# Patient Record
Sex: FEMALE | Race: WHITE | URBAN - METROPOLITAN AREA
[De-identification: names, ages, dates, MRNs, and addresses within clinical notes are randomized per-mention and may not be internally consistent; named-entity substitution may affect disease eponyms.]

---

## 2017-04-07 ENCOUNTER — HOSPITAL ENCOUNTER (OUTPATIENT)
Dept: MAMMOGRAPHY | Age: 72
Discharge: HOME | End: 2017-04-07
Attending: INTERNAL MEDICINE | Admitting: INTERNAL MEDICINE

## 2017-04-07 DIAGNOSIS — Z12.31 ENCOUNTER FOR SCREENING MAMMOGRAM FOR BREAST CANCER: ICD-10-CM

## 2018-06-29 ENCOUNTER — HOSPITAL ENCOUNTER (OUTPATIENT)
Dept: MAMMOGRAPHY | Age: 73
Discharge: HOME | End: 2018-06-29
Attending: OBSTETRICS & GYNECOLOGY | Admitting: OBSTETRICS & GYNECOLOGY

## 2018-06-29 DIAGNOSIS — Z12.39 BREAST CANCER SCREENING: ICD-10-CM

## 2021-03-02 ENCOUNTER — IMMUNIZATION (OUTPATIENT)
Dept: PRIMARY CARE CLINIC | Age: 76
End: 2021-03-02
Payer: MEDICARE

## 2021-03-02 PROCEDURE — 0011A PR IMM ADMN SARSCOV2 100 MCG/0.5 ML 1ST DOSE: CPT | Performed by: FAMILY MEDICINE

## 2021-03-02 PROCEDURE — 91301 COVID-19, MODERNA VACCINE 100MCG/0.5ML DOSE: CPT | Performed by: FAMILY MEDICINE

## 2021-03-30 ENCOUNTER — IMMUNIZATION (OUTPATIENT)
Dept: PRIMARY CARE CLINIC | Age: 76
End: 2021-03-30
Payer: MEDICARE

## 2021-03-30 PROCEDURE — 91301 COVID-19, MODERNA VACCINE 100MCG/0.5ML DOSE: CPT

## 2021-03-30 PROCEDURE — 0012A COVID-19, MODERNA VACCINE 100MCG/0.5ML DOSE: CPT

## 2022-11-28 ENCOUNTER — HOSPITAL ENCOUNTER (OUTPATIENT)
Dept: WOMENS IMAGING | Age: 77
Discharge: HOME | End: 2022-11-28
Payer: MEDICARE

## 2022-11-28 VITALS — BODY MASS INDEX: 30.82 KG/M2 | WEIGHT: 137 LBS | HEIGHT: 56 IN

## 2022-11-28 DIAGNOSIS — Z12.31 VISIT FOR SCREENING MAMMOGRAM: ICD-10-CM

## 2022-11-28 PROCEDURE — 77067 SCR MAMMO BI INCL CAD: CPT

## 2023-03-13 ENCOUNTER — HOSPITAL ENCOUNTER (OUTPATIENT)
Age: 78
Setting detail: OUTPATIENT SURGERY
Discharge: HOME | End: 2023-03-13
Attending: INTERNAL MEDICINE | Admitting: INTERNAL MEDICINE
Payer: MEDICARE

## 2023-03-13 ENCOUNTER — ANESTHESIA (OUTPATIENT)
Dept: ENDOSCOPY | Age: 78
End: 2023-03-13
Payer: MEDICARE

## 2023-03-13 ENCOUNTER — ANESTHESIA EVENT (OUTPATIENT)
Dept: ENDOSCOPY | Age: 78
End: 2023-03-13
Payer: MEDICARE

## 2023-03-13 VITALS
DIASTOLIC BLOOD PRESSURE: 64 MMHG | SYSTOLIC BLOOD PRESSURE: 170 MMHG | OXYGEN SATURATION: 94 % | HEART RATE: 67 BPM | WEIGHT: 140 LBS | RESPIRATION RATE: 18 BRPM | HEIGHT: 58 IN | TEMPERATURE: 97.5 F | BODY MASS INDEX: 29.39 KG/M2

## 2023-03-13 DIAGNOSIS — K75.81 LIVER CIRRHOSIS SECONDARY TO NASH (NONALCOHOLIC STEATOHEPATITIS) (HCC): ICD-10-CM

## 2023-03-13 DIAGNOSIS — K74.60 LIVER CIRRHOSIS SECONDARY TO NASH (NONALCOHOLIC STEATOHEPATITIS) (HCC): ICD-10-CM

## 2023-03-13 LAB
GLUCOSE BLD-MCNC: 113 MG/DL (ref 70–99)
PERFORMED ON: ABNORMAL

## 2023-03-13 PROCEDURE — 3700000001 HC ADD 15 MINUTES (ANESTHESIA): Performed by: INTERNAL MEDICINE

## 2023-03-13 PROCEDURE — 3609012400 HC EGD TRANSORAL BIOPSY SINGLE/MULTIPLE: Performed by: INTERNAL MEDICINE

## 2023-03-13 PROCEDURE — 2720000010 HC SURG SUPPLY STERILE: Performed by: INTERNAL MEDICINE

## 2023-03-13 PROCEDURE — 7100000010 HC PHASE II RECOVERY - FIRST 15 MIN: Performed by: INTERNAL MEDICINE

## 2023-03-13 PROCEDURE — 88305 TISSUE EXAM BY PATHOLOGIST: CPT

## 2023-03-13 PROCEDURE — 3700000000 HC ANESTHESIA ATTENDED CARE: Performed by: INTERNAL MEDICINE

## 2023-03-13 PROCEDURE — 2500000003 HC RX 250 WO HCPCS: Performed by: NURSE ANESTHETIST, CERTIFIED REGISTERED

## 2023-03-13 PROCEDURE — 2580000003 HC RX 258: Performed by: FAMILY MEDICINE

## 2023-03-13 PROCEDURE — 6360000002 HC RX W HCPCS: Performed by: NURSE ANESTHETIST, CERTIFIED REGISTERED

## 2023-03-13 PROCEDURE — 2709999900 HC NON-CHARGEABLE SUPPLY: Performed by: INTERNAL MEDICINE

## 2023-03-13 PROCEDURE — 7100000011 HC PHASE II RECOVERY - ADDTL 15 MIN: Performed by: INTERNAL MEDICINE

## 2023-03-13 RX ORDER — PROPOFOL 10 MG/ML
INJECTION, EMULSION INTRAVENOUS PRN
Status: DISCONTINUED | OUTPATIENT
Start: 2023-03-13 | End: 2023-03-13 | Stop reason: SDUPTHER

## 2023-03-13 RX ORDER — CITALOPRAM 40 MG/1
TABLET ORAL DAILY
COMMUNITY
Start: 2022-12-06

## 2023-03-13 RX ORDER — LIDOCAINE HYDROCHLORIDE 20 MG/ML
INJECTION, SOLUTION EPIDURAL; INFILTRATION; INTRACAUDAL; PERINEURAL PRN
Status: DISCONTINUED | OUTPATIENT
Start: 2023-03-13 | End: 2023-03-13 | Stop reason: SDUPTHER

## 2023-03-13 RX ORDER — SODIUM CHLORIDE, SODIUM LACTATE, POTASSIUM CHLORIDE, CALCIUM CHLORIDE 600; 310; 30; 20 MG/100ML; MG/100ML; MG/100ML; MG/100ML
INJECTION, SOLUTION INTRAVENOUS CONTINUOUS
Status: DISCONTINUED | OUTPATIENT
Start: 2023-03-13 | End: 2023-03-13 | Stop reason: HOSPADM

## 2023-03-13 RX ADMIN — PROPOFOL 50 MG: 10 INJECTION, EMULSION INTRAVENOUS at 09:41

## 2023-03-13 RX ADMIN — SODIUM CHLORIDE, POTASSIUM CHLORIDE, SODIUM LACTATE AND CALCIUM CHLORIDE: 600; 310; 30; 20 INJECTION, SOLUTION INTRAVENOUS at 09:05

## 2023-03-13 RX ADMIN — PROPOFOL 50 MG: 10 INJECTION, EMULSION INTRAVENOUS at 09:36

## 2023-03-13 RX ADMIN — PROPOFOL 60 MG: 10 INJECTION, EMULSION INTRAVENOUS at 09:30

## 2023-03-13 RX ADMIN — LIDOCAINE HYDROCHLORIDE 50 MG: 20 INJECTION, SOLUTION EPIDURAL; INFILTRATION; INTRACAUDAL; PERINEURAL at 09:30

## 2023-03-13 RX ADMIN — PROPOFOL 30 MG: 10 INJECTION, EMULSION INTRAVENOUS at 09:32

## 2023-03-13 ASSESSMENT — ENCOUNTER SYMPTOMS: SHORTNESS OF BREATH: 0

## 2023-03-13 ASSESSMENT — LIFESTYLE VARIABLES: SMOKING_STATUS: 0

## 2023-03-13 ASSESSMENT — PAIN - FUNCTIONAL ASSESSMENT: PAIN_FUNCTIONAL_ASSESSMENT: 0-10

## 2023-03-13 NOTE — PROCEDURES
EGD PROCEDURE NOTE                                                    Patient: Minna Bunch MRN: [id number]     YOB: 1945  Age: 78 y.o. Sex: female    Unit: [facility] ENDOSCOPY Room/Bed: Endo Pool/NONE Location: [address] [place] [address]       Admitting Physician: Raine Goetz    Primary Care Physician: Yaneth Matthew      Referring  Physician:      Facility:   [address] [place] [address] [Outpatient]  : Harvinder Babb MD      PROCEDURE:  Esophagogastroduodenoscopy with biopsy  Procedure(s):  EGD BIOPSY    Preoperative Diagnosis:   Liver cirrhosis secondary to RAI (nonalcoholic steatohepatitis) ([facility] [K75.81, K74.60]  Portal Hypertension  Screening  for esophageal varices    Post Operative Diagnosis:  Same as documented in Diagnosis    INSTRUMENT:  Olympus  Gastroscope    HISTORY & PHYSICAL:  Patient's history, physical, medications, allergies, labs reviewed prior to procedure. Indication:   Same as the preop diagnosis. AllergiesAtenolol, Bee venom, Ciprofloxacin, Codeine, Diclofenac, Erythromycin base, Lisinopril, Meloxicam, Nabumetone, Protein c concentrate (human), Quinolones, Sulfa antibiotics, and Adhesive tape   Allergies noted: YES  Vital signs reviewed: YES    ASA: No  found with the name: Barrett Kinsey: Monitor Anesthesia Care with anesthesia  see nurse documentation for details    Patient in acceptable condition for procedure:YES  Written informed consent obtained from  patient. . Risks (including but not limited to perforation, bloating, infection, perforation  and bleeding adverse drug reaction missed lesions and aspiration during the procedure requiring medical or surgical management) benefits and alternatives explained and questions answered. The  patient. verbalized understanding. A timeoout procedure was performed.  Based on the pre-procedure assessment, including review of the patient's medical history, medications, allergies, and review of systems, patient had been deemed to be an appropriate candidate for sedation as planned above. Patient was therefore sedated with the medications listed above. Patient was monitored continuously with electrocardiogram tracing, pulse oximetry, blood pressure monitoring, and direct visualization. Under continue IV sedation with close monitoring, endoscope passed in to the 3rd portion of the duodenum by direct visualization. POST OPERATIVE FINDINGS:   ESOPHAGUS: Diaphragmatic hiatus was 36 cm from incisors and GE junction (upper margin of gastric folds) was at 36 cm from incisors. Squamocolumnar junction was at 36 cm from incisors. Mucosa appeared normal.  STOMACH:  Pylorus patent. Antrum revealed mild diffuse gastritis,  Body, fundus and cardia, including retroflexed views revealed mild portal hypertensive gastropathy and patchy melanosis of the gastric mucosa especially involving the parts of the greater curvature and parts of the lesser curvature of the stomach. Stomach insufflated normally. No evidence of gastric varices noted on retroflexion. Large 15 mm hyperplastic appearing polyp noted that was biopsied to rule out adenomatous changes. Clip placed at the base of the polyp to prevent bleeding. .   Gastric antral and body of stomach Bx done to  r/o H. Pylori gastritis. DUODENUM: Duodenal bulb, Descending portion of the duodenum revealed diffuse melanosis of the duodenal mucosa    The patient was then decompressed. Scope was then withdrawn. Patient tolerated procedure well. DIAGNOSIS:   1) Diffuse melanosis of the duodenum and melanosis of the parts of the stomach noted. 2) Mild diffuse portal hypertensive gastropathy. 3) No evidence of esophageal or gastric varices. 4) Hyperplastic appearing 15 mm gastric polyp in the proximal stomach biopsied to rule out pathology and clip placed at the biopsy site to prevent further bleeding.   5) Antral gastritis. RECOMMENDATIONS:   1) continue omeprazole  2) Await Pathology results in next 7 days  3) Avoid pain medications like Motrin/Advil/ibuprofen/Aleve/naproxen or similar NSAIDs    4) Discharge home when standard recovery parameters are met. 5) F/U with me as scheduled. Gastric or Duodenal ulcer present: no  Biopsy done to rule out Helicobacter pylori infection:  yes  MICHAELA PATH SPECIMEN SENT:  yes  PHOTOGRAPH TAKEN:  yes In MD Reports     COMPLICATIONS DURING PROCEDURE:   None      EBL: None  . DISPOSITION: The patient was sent to the recovery room in stable condition. CC: Primary Care/Referring Physician(s): Marcel Hensley  Patient meets criteria for discharge/transfer: yes  Results and plan discussed with the patient and family in detail. Signed By:   Dillon Jackson MD  [facility]  9:44 AM 3/13/2023       \"Please note that some or all of this record was generated using voice recognition software. If there are any questions about the content of this document, please contact the author as some errors of transcription may have occurred. \"

## 2023-03-13 NOTE — DISCHARGE INSTRUCTIONS
ENDOSCOPY DISCHARGE INSTRUCTIONS:    Call the physician that did your procedure for any questions or concern:    [facility]: [phone]  DR. KAY DESOUZA  DIAGNOSIS:   1) Diffuse melanosis of the duodenum and melanosis of the parts of the stomach noted. 2) Mild diffuse portal hypertensive gastropathy. 3) No evidence of esophageal or gastric varices. 4) Hyperplastic appearing 15 mm gastric polyp in the proximal stomach biopsied to rule out pathology and clip placed at the biopsy site to prevent further bleeding. 5) Antral gastritis. RECOMMENDATIONS:   1) continue omeprazole  2) Await Pathology results in next 7 days  3) Avoid pain medications like Motrin/Advil/ibuprofen/Aleve/naproxen or similar NSAIDs    4) Discharge home when standard recovery parameters are met. 5) F/U with me as scheduled. ACTIVITY:    There are potential side effects to the medications used for sedation and anesthesia during your procedure. These include:  Dizziness or light-headedness, confusion or memory loss, delayed reaction times, loss of coordination, nausea and vomiting. Because of your increased risk for injury, we ask that you observe the following precautions: For the next 24 hours,  DO NOT operate an automobile, bicycle, motorcycle, , power tools or large equipment of any kind. Do not drink alcohol, sign any legal documents or make any legal decisions for 24 hours. Do not bend your head over lower than your heart. DO sit on the side of bed/couch awhile before getting up. Plan on bedrest or quiet relaxation today. You may resume normal activities in 24 hours. DIET:    Your first meal today should be light, avoiding spicy and fatty foods. If you tolerate this first meal, then you may advance to your regular diet unless otherwise advised by your physician. NORMAL SYMPTOMS:  -Mild sore throat if youve had an EGD   -Gaseous discomfort    NOTIFY YOUR PHYSICIAN IF THESE SYMPTOMS OCCUR:  1. Fever (greater than 100)  5. Increased abdominal bloating  2. Severe pain    6. Excessive bleeding  3. Nausea and vomiting  7. Chest pain                                                                    4. Chills    8. Shortness of breath    ADDITIONAL INSTRUCTIONS:    Biopsy results: Call [address] for biopsy results in 1 week    Educational Information:          Please review these discharge instructions this evening or tomorrow for  information you may have forgotten. We want to thank you for choosing the [facility] as your health care provider. We always strive to provide you with excellent care while you are here. You may receive a survey in the mail regarding your care. We would appreciate you taking a few minutes of your time to complete this survey. Attending Attestation (For Attendings USE Only)...

## 2023-03-13 NOTE — PROGRESS NOTES
Ambulatory Surgery/Procedure Discharge Note    Vitals:    03/13/23 1000   BP: (!) 171/53   Pulse: 71   Resp: 16   Temp:    SpO2: 94%   T-97.5  Pt meets discharge criteria per Shekhar score. No intake/output data recorded. Restroom use offered before discharge. Yes    Pain assessment:  none  Pain Level: 0    Pt and S.O./family states \"ready to go home\". Pt alert and oriented x4. IV removed. Denies N/V or pain. Voided prior to discharge. Discharge instructions given to pt and daughter with pt permission. Pt and daughter verbalized understanding of all instructions. Left with all belongings,  and discharge instructions. Patient discharged to home/self care.  Patient discharged via wheel chair by transporter to waiting family/S.O.       3/13/2023 10:09 AM

## 2023-03-13 NOTE — ANESTHESIA PRE PROCEDURE
Department of Anesthesiology  Preprocedure Note       Name:  Mateo Sandifer   Age:  78 y.o.  :  1945                                          MRN:  [id number]         Date:  3/13/2023      Surgeon: Shaun Nogueira):  Soledad Wilks MD    Procedure: Procedure(s):  ESOPHAGOGASTRODUODENOSCOPY    Medications prior to admission:   Prior to Admission medications    Medication Sig Start Date End Date Taking? Authorizing Provider   citalopram (CELEXA) 40 MG tablet Take by mouth daily 22  Yes Historical Provider, MD   omeprazole (PRILOSEC) 20 MG capsule Take 20 mg by mouth 2 times daily    Historical Provider, MD   losartan (COZAAR) 50 MG tablet Take 50 mg by mouth daily    Historical Provider, MD   atorvastatin (LIPITOR) 20 MG tablet Take 20 mg by mouth daily    Historical Provider, MD   vitamin E 400 UNIT capsule Take 400 Units by mouth 2 times daily    Historical Provider, MD   aspirin 81 MG chewable tablet Take 81 mg by mouth daily    Historical Provider, MD   Multiple Vitamins-Minerals (THERAPEUTIC MULTIVITAMIN-MINERALS) tablet Take 1 tablet by mouth daily    Historical Provider, MD       Current medications:    Current Facility-Administered Medications   Medication Dose Route Frequency Provider Last Rate Last Admin    lactated ringers IV soln infusion   IntraVENous Continuous Allyn Marin MD           Allergies: Allergies   Allergen Reactions    Atenolol Anaphylaxis    Bee Venom Swelling     Last bee sting over 10 years ago arm doubled in size. No trouble breathing.  Ciprofloxacin     Codeine     Diclofenac Other (See Comments)     ELEVATED BLOOD PRESSURE    Erythromycin Base     Lisinopril     Meloxicam     Nabumetone     Protein C Concentrate (Human)     Quinolones Swelling    Sulfa Antibiotics     Adhesive Tape Rash       Problem List:  There is no problem list on file for this patient.       Past Medical History:        Diagnosis Date    Arthritis     Cirrhosis, non-alcoholic ([facility]     Diabetes mellitus ([facility]     Hyperlipidemia     Hypertension     Sleep apnea     cpap       Past Surgical History:        Procedure Laterality Date    APPENDECTOMY      CARPAL TUNNEL RELEASE Bilateral     CATARACT REMOVAL WITH IMPLANT Right 4-28-16    CATARACT REMOVAL WITH IMPLANT Left 5/26/16    JOINT REPLACEMENT Bilateral     knee    NASAL SEPTUM SURGERY      SHOULDER SURGERY Left     x2    TUBAL LIGATION         Social History:    Social History     Tobacco Use    Smoking status: Former     Types: Cigarettes     Start date: 1/1/2010    Smokeless tobacco: Not on file   Substance Use Topics    Alcohol use: No                                Counseling given: Not Answered      Vital Signs (Current):   Vitals:    03/13/23 0813   BP: (!) 184/68   Pulse: 68   Resp: 15   Temp: 97.7 °F (36.5 °C)   TempSrc: Oral   SpO2: 96%   Weight: 140 lb (63.5 kg)   Height: 4' 10\" (1.473 m)                                              BP Readings from Last 3 Encounters:   03/13/23 (!) 184/68   05/26/16 (!) 176/99   04/28/16 147/68       NPO Status: Time of last liquid consumption: 2200                        Time of last solid consumption: 1700                        Date of last liquid consumption: 03/12/23                        Date of last solid food consumption: 03/12/23    BMI:   Wt Readings from Last 3 Encounters:   03/13/23 140 lb (63.5 kg)   11/28/22 137 lb (62.1 kg)   05/26/16 160 lb (72.6 kg)     Body mass index is 29.26 kg/m². CBC: No results found for: WBC, RBC, HGB, HCT, MCV, RDW, PLT    CMP: No results found for: NA, K, CL, CO2, BUN, CREATININE, GFRAA, AGRATIO, LABGLOM, GLUCOSE, GLU, PROT, CALCIUM, BILITOT, ALKPHOS, AST, ALT    POC Tests: No results for input(s): POCGLU, POCNA, POCK, POCCL, POCBUN, POCHEMO, POCHCT in the last 72 hours.     Coags: No results found for: PROTIME, INR, APTT    HCG (If Applicable): No results found for: PREGTESTUR, PREGSERUM, HCG, HCGQUANT     ABGs: No results found for: PHART, PO2ART, PNM7QCJ, FIR6BJW, BEART, H8AOGCRM     Type & Screen (If Applicable):  No results found for: LABABO, LABRH    Drug/Infectious Status (If Applicable):  No results found for: HIV, HEPCAB    COVID-19 Screening (If Applicable): No results found for: COVID19        Anesthesia Evaluation    Airway: Mallampati: III  TM distance: <3 FB   Neck ROM: limited  Mouth opening: > = 3 FB   Dental:    (+) other      Pulmonary:   (+) sleep apnea:      (-) asthma, shortness of breath and not a current smoker                           Cardiovascular:  Exercise tolerance: poor (<4 METS),   (+) hypertension:,                   Neuro/Psych:      (-) seizures and CVA           GI/Hepatic/Renal:   (+) liver disease:,           Endo/Other:        (-) diabetes mellitus               Abdominal:             Vascular: Other Findings:           Anesthesia Plan      MAC     ASA 3       Induction: intravenous. Anesthetic plan and risks discussed with patient. Plan discussed with CRNA.                     Tristan Lazar MD   3/13/2023

## 2023-03-13 NOTE — ANESTHESIA POSTPROCEDURE EVALUATION
Department of Anesthesiology  Postprocedure Note    Patient: Roderick Lozano  MRN: [id number]  YOB: 1945  Date of evaluation: 3/13/2023      Procedure Summary     Date: 03/13/23 Room / Location: [place] [address] / [facility]    Anesthesia Start: 8202 Anesthesia Stop: 7941    Procedure: EGD BIOPSY Diagnosis:       Liver cirrhosis secondary to RAI (nonalcoholic steatohepatitis) ([facility]      (Liver cirrhosis secondary to RIA (nonalcoholic steatohepatitis) ([facility] [K75.81, K74.60])    Surgeons: Aleksandr Henley MD Responsible Provider: Leyla Grimm MD    Anesthesia Type: MAC ASA Status: 3          Anesthesia Type: No value filed.     Shekhar Phase I: Shekhar Score: 10    Shekhar Phase II: Shekhar Score: 10      Anesthesia Post Evaluation    Patient location during evaluation: PACU  Patient participation: complete - patient participated  Level of consciousness: awake  Pain score: 0  Airway patency: patent  Nausea & Vomiting: no nausea and no vomiting  Complications: no  Cardiovascular status: hemodynamically stable  Respiratory status: acceptable  Hydration status: euvolemic

## 2023-03-13 NOTE — H&P
GI Endoscopy Outpatient Procedure H&P    Patient: Cesar Morales MRN: [id number]     YOB: 1945  Age: 78 y.o. Sex: female    Unit: [facility] ENDOSCOPY Room/Bed: Endo Pool/NONE Location: [address] [place] [address]     Referring  Physician: Ramon Apple is a 78 y.o. female  here for following procedure:    PROCEDURE:    Procedure(s):  ESOPHAGOGASTRODUODENOSCOPY    PRE OPERATIVE DIAGNOSIS:   Liver cirrhosis secondary to RAI (nonalcoholic steatohepatitis) ([facility] [K75.81, K74.60]  Portal hypertension  Screening for esophageal varices    INDICATIONS:   Same as the preop diagnosis. Pt seen and examined. H& P reviewed. History Obtained From:  patient  77 yo F with RAI Cirrhosis (2014)  previously F/b Dr. Chary Lyons . No HCC on US 1/16/22  Last EGD 2/2019: Esophagus was unremarkable without varices or erosions  LEVAR F/b Dr. Marifer Arambula. Recent Blood w/u from 12/2022 with Hgb 10.9 baseline, Platelet ct 071; Creatinine 1.09; Normal LFT's. [address]  Denies decompensated liver disease symptoms  BMI 30 (Pre diabetic off Metformin 2022, HTN, HL) HBA1C 6.1 in 12/2022  Ch GERD on omeprazole 20 mg daily, occasional break thru symptoms  Ch Normocytic anemia    RELEVANT HISTORY:  PMHx for LEVAR on Iron; CAD, DM  type II, HDL, Polymyalgia rheumatica; HTN. Abdominal surgeries include Appendectomy. 1/16/2023 RUQ US: Minimal fatty infiltration with nodular appearance. No focal mass lesion    -- Last COLONOSCOPY: 9/24/2019 POSTOPERATIVE DIAGNOSIS: Moderate left-sided diverticulosis. 2 polyps removed. PATH: tubular/hyperplastic polyps, 5 year recall (9/2024). -- 6/22/2010 LIVER BIOPSY: Final Pathologic Diagnosis Liver, biopsy: - Fragmented specimen with mild to moderate steatosis, mild steatohepatitis and at least bridging fibrosis (see comment). Comment(s)  Multiple fragments of liver parenchyma are present for evaluation.   There  is mild to moderate fatty change (grade 1-2 of 3) and mild steatohepatitis  (grade 1 of 3). There is also pericellular fibrosis, periportal fibrosis and  identifiable bridging fibrosis (stage 3 of 4). However, the biopsy is very  fragmented and early cirrhosis cannot be ruled out or confirmed. These histologic changes may be seen in alcoholic and non-alcoholic conditions  (non-alcoholic steatohepatitis or RAI). RAI is usually associated with  diabetes mellitus, hyperlipidemia/hypercholesterolemia and obesity. Correlation  with clinical information is recommended. PMH, PSH , Allergies, Medications reviewed. Past Medical History:   Diagnosis Date    Arthritis     Cirrhosis, non-alcoholic ([address]     Diabetes mellitus ([address]     Hyperlipidemia     Hypertension        Past Surgical History:   Procedure Laterality Date    APPENDECTOMY      CARPAL TUNNEL RELEASE Bilateral     CATARACT REMOVAL WITH IMPLANT Right 4-28-16    CATARACT REMOVAL WITH IMPLANT Left 5/26/16    JOINT REPLACEMENT Bilateral     knee    NASAL SEPTUM SURGERY      SHOULDER SURGERY Left     x2    TUBAL LIGATION         Allergies: Atenolol, Bee venom, Ciprofloxacin, Codeine, Diclofenac, Erythromycin base, Lisinopril, Meloxicam, Nabumetone, Protein c concentrate (human), Quinolones, Sulfa antibiotics, and Adhesive tape   Allergies noted: Yes     Medications reviewed. No current facility-administered medications on file prior to encounter.      Current Outpatient Medications on File Prior to Encounter   Medication Sig Dispense Refill    citalopram (CELEXA) 40 MG tablet Take by mouth daily      omeprazole (PRILOSEC) 20 MG capsule Take 20 mg by mouth 2 times daily      losartan (COZAAR) 50 MG tablet Take 50 mg by mouth daily      atorvastatin (LIPITOR) 20 MG tablet Take 20 mg by mouth daily      vitamin E 400 UNIT capsule Take 400 Units by mouth 2 times daily      aspirin 81 MG chewable tablet Take 81 mg by mouth daily      Multiple Vitamins-Minerals (THERAPEUTIC MULTIVITAMIN-MINERALS) tablet Take 1 tablet by mouth daily           Social History:  Social History     Tobacco Use    Smoking status: Former     Types: Cigarettes     Start date: 1/1/2010    Smokeless tobacco: Not on file   Substance Use Topics    Alcohol use: No         Family History:   Family History   Problem Relation Age of Onset    Ovarian Cancer Mother     Cancer Mother     Other Father         carbon monoxide poisoning    Diabetes Sister     High Blood Pressure Sister     Heart Disease Brother         PHYSICAL EXAM:     VITAL SIGNS   BP (!) 184/68   Pulse 68   Temp 97.7 °F (36.5 °C) (Oral)   Resp 15   Ht 4' 10\" (1.473 m)   Wt 140 lb (63.5 kg)   SpO2 96%   BMI 29.26 kg/m²  I     Height Height: 4' 10\" (147.3 cm)   Weight Weight: 140 lb (63.5 kg)   BMI Body mass index is 29.26 kg/m². Vital signs reviewed:Yes  see nurse documentation as well for details    General appearance:   No Acute distress    Lungs: Good diaphragmatic excursion. No use of accessory muscles of respiration noted. Heart:    RRR on monitor  Abdomen:   Soft, nontender, nondistended. Extremities:   Edema : no  Neuro:  No focal deficits. Alert and oriented      LABS   Basic Metabolic Profile No results found for: NA, K, CL, CO2, BUN, CREATININE, GLUCOSE, PHOS, MG   Complete Blood Count No results found for: WBC, HGB, HCT, PLT   Coagulation Studies No results found for: INR, PTT     No results found for this or any previous visit. ASA Grade:  ASA 3 - Patient with moderate systemic disease with functional limitations   Per Anesthesia  Mallampati Score: III   Per Anesthesia      ASSESSMENT AND PLAN:    1. Patient is a 78 y.o. female with above specified procedure planned   Monitor Anesthesia Care  with anesthesia  2. Procedure options, risks and benefits and alternatives available for the procedure with possible intervention  reviewed with patient. Patient expresses understanding and informed consent obtained prior to the procedure. .    Patient in acceptable condition for procedure:  Yes    Leigh Sorensen MD  8:50 AM 3/13/2023

## 2023-11-30 ENCOUNTER — HOSPITAL ENCOUNTER (OUTPATIENT)
Dept: WOMENS IMAGING | Age: 78
Discharge: HOME | End: 2023-11-30
Payer: MEDICARE

## 2023-11-30 VITALS — BODY MASS INDEX: 32.79 KG/M2 | HEIGHT: 57 IN | WEIGHT: 152 LBS

## 2023-11-30 DIAGNOSIS — Z12.31 VISIT FOR SCREENING MAMMOGRAM: ICD-10-CM

## 2023-11-30 PROCEDURE — 77063 BREAST TOMOSYNTHESIS BI: CPT

## 2024-10-02 ENCOUNTER — ANESTHESIA (OUTPATIENT)
Dept: ENDOSCOPY | Age: 79
End: 2024-10-02
Payer: MEDICARE

## 2024-10-02 ENCOUNTER — ANESTHESIA EVENT (OUTPATIENT)
Dept: ENDOSCOPY | Age: 79
End: 2024-10-02
Payer: MEDICARE

## 2024-10-02 ENCOUNTER — HOSPITAL ENCOUNTER (OUTPATIENT)
Age: 79
Setting detail: OUTPATIENT SURGERY
Discharge: HOME | End: 2024-10-02
Attending: INTERNAL MEDICINE | Admitting: INTERNAL MEDICINE
Payer: MEDICARE

## 2024-10-02 VITALS
SYSTOLIC BLOOD PRESSURE: 140 MMHG | BODY MASS INDEX: 32.54 KG/M2 | HEIGHT: 58 IN | WEIGHT: 155 LBS | HEART RATE: 91 BPM | RESPIRATION RATE: 16 BRPM | TEMPERATURE: 97.1 F | OXYGEN SATURATION: 93 % | DIASTOLIC BLOOD PRESSURE: 70 MMHG

## 2024-10-02 DIAGNOSIS — D50.0 IRON DEFICIENCY ANEMIA SECONDARY TO BLOOD LOSS (CHRONIC): ICD-10-CM

## 2024-10-02 DIAGNOSIS — K74.60 CIRRHOSIS OF LIVER WITHOUT ASCITES, UNSPECIFIED HEPATIC CIRRHOSIS TYPE (HCC): ICD-10-CM

## 2024-10-02 LAB
GLUCOSE BLD-MCNC: 110 MG/DL (ref 70–99)
PERFORMED ON: ABNORMAL

## 2024-10-02 PROCEDURE — 7100000011 HC PHASE II RECOVERY - ADDTL 15 MIN: Performed by: INTERNAL MEDICINE

## 2024-10-02 PROCEDURE — 3609010600 HC COLONOSCOPY POLYPECTOMY SNARE/COLD BIOPSY: Performed by: INTERNAL MEDICINE

## 2024-10-02 PROCEDURE — 3700000000 HC ANESTHESIA ATTENDED CARE: Performed by: INTERNAL MEDICINE

## 2024-10-02 PROCEDURE — 2709999900 HC NON-CHARGEABLE SUPPLY: Performed by: INTERNAL MEDICINE

## 2024-10-02 PROCEDURE — 2500000003 HC RX 250 WO HCPCS

## 2024-10-02 PROCEDURE — 88305 TISSUE EXAM BY PATHOLOGIST: CPT

## 2024-10-02 PROCEDURE — 2580000003 HC RX 258: Performed by: ANESTHESIOLOGY

## 2024-10-02 PROCEDURE — 2580000003 HC RX 258

## 2024-10-02 PROCEDURE — 3700000001 HC ADD 15 MINUTES (ANESTHESIA): Performed by: INTERNAL MEDICINE

## 2024-10-02 PROCEDURE — 3609017100 HC EGD: Performed by: INTERNAL MEDICINE

## 2024-10-02 PROCEDURE — 7100000010 HC PHASE II RECOVERY - FIRST 15 MIN: Performed by: INTERNAL MEDICINE

## 2024-10-02 PROCEDURE — 6360000002 HC RX W HCPCS

## 2024-10-02 RX ORDER — ICOSAPENT ETHYL 1 G/1
2 CAPSULE ORAL 2 TIMES DAILY WITH MEALS
COMMUNITY
Start: 2024-06-28

## 2024-10-02 RX ORDER — PANTOPRAZOLE SODIUM 40 MG/1
40 TABLET, DELAYED RELEASE ORAL DAILY
COMMUNITY
Start: 2024-09-23

## 2024-10-02 RX ORDER — PROPOFOL 10 MG/ML
INJECTION, EMULSION INTRAVENOUS
Status: DISCONTINUED | OUTPATIENT
Start: 2024-10-02 | End: 2024-10-02 | Stop reason: SDUPTHER

## 2024-10-02 RX ORDER — HYDRALAZINE HYDROCHLORIDE 100 MG/1
100 TABLET, FILM COATED ORAL 2 TIMES DAILY
COMMUNITY
Start: 2024-06-28

## 2024-10-02 RX ORDER — SODIUM CHLORIDE, SODIUM LACTATE, POTASSIUM CHLORIDE, CALCIUM CHLORIDE 600; 310; 30; 20 MG/100ML; MG/100ML; MG/100ML; MG/100ML
INJECTION, SOLUTION INTRAVENOUS
Status: DISCONTINUED | OUTPATIENT
Start: 2024-10-02 | End: 2024-10-02 | Stop reason: SDUPTHER

## 2024-10-02 RX ORDER — FENOFIBRATE 48 MG/1
48 TABLET, COATED ORAL DAILY
COMMUNITY

## 2024-10-02 RX ORDER — ATORVASTATIN CALCIUM 80 MG/1
80 TABLET, FILM COATED ORAL NIGHTLY
COMMUNITY

## 2024-10-02 RX ORDER — HYDRALAZINE HYDROCHLORIDE 20 MG/ML
INJECTION INTRAMUSCULAR; INTRAVENOUS
Status: DISCONTINUED | OUTPATIENT
Start: 2024-10-02 | End: 2024-10-02 | Stop reason: SDUPTHER

## 2024-10-02 RX ORDER — LIDOCAINE HYDROCHLORIDE 20 MG/ML
INJECTION, SOLUTION INFILTRATION; PERINEURAL
Status: DISCONTINUED | OUTPATIENT
Start: 2024-10-02 | End: 2024-10-02 | Stop reason: SDUPTHER

## 2024-10-02 RX ORDER — VALSARTAN 320 MG/1
320 TABLET ORAL DAILY
COMMUNITY
Start: 2024-06-28

## 2024-10-02 RX ORDER — DILTIAZEM HYDROCHLORIDE 120 MG/1
120 CAPSULE, EXTENDED RELEASE ORAL DAILY
COMMUNITY

## 2024-10-02 RX ORDER — ASPIRIN 81 MG/1
81 TABLET ORAL DAILY
COMMUNITY

## 2024-10-02 RX ORDER — SODIUM CHLORIDE, SODIUM LACTATE, POTASSIUM CHLORIDE, CALCIUM CHLORIDE 600; 310; 30; 20 MG/100ML; MG/100ML; MG/100ML; MG/100ML
INJECTION, SOLUTION INTRAVENOUS CONTINUOUS
Status: DISCONTINUED | OUTPATIENT
Start: 2024-10-02 | End: 2024-10-02 | Stop reason: HOSPADM

## 2024-10-02 RX ADMIN — PROPOFOL 30 MG: 10 INJECTION, EMULSION INTRAVENOUS at 13:52

## 2024-10-02 RX ADMIN — SODIUM CHLORIDE, SODIUM LACTATE, POTASSIUM CHLORIDE, AND CALCIUM CHLORIDE: .6; .31; .03; .02 INJECTION, SOLUTION INTRAVENOUS at 13:39

## 2024-10-02 RX ADMIN — PROPOFOL 70 MG: 10 INJECTION, EMULSION INTRAVENOUS at 13:46

## 2024-10-02 RX ADMIN — PROPOFOL 125 MCG/KG/MIN: 10 INJECTION, EMULSION INTRAVENOUS at 13:48

## 2024-10-02 RX ADMIN — HYDRALAZINE HYDROCHLORIDE 6 MG: 20 INJECTION INTRAMUSCULAR; INTRAVENOUS at 14:13

## 2024-10-02 RX ADMIN — PROPOFOL 30 MG: 10 INJECTION, EMULSION INTRAVENOUS at 14:06

## 2024-10-02 RX ADMIN — LIDOCAINE HYDROCHLORIDE 100 MG: 20 INJECTION, SOLUTION INFILTRATION; PERINEURAL at 13:46

## 2024-10-02 RX ADMIN — HYDRALAZINE HYDROCHLORIDE 4 MG: 20 INJECTION INTRAMUSCULAR; INTRAVENOUS at 13:57

## 2024-10-02 RX ADMIN — SODIUM CHLORIDE, POTASSIUM CHLORIDE, SODIUM LACTATE AND CALCIUM CHLORIDE: 600; 310; 30; 20 INJECTION, SOLUTION INTRAVENOUS at 13:15

## 2024-10-02 RX ADMIN — HYDRALAZINE HYDROCHLORIDE 4 MG: 20 INJECTION INTRAMUSCULAR; INTRAVENOUS at 14:07

## 2024-10-02 ASSESSMENT — PAIN - FUNCTIONAL ASSESSMENT: PAIN_FUNCTIONAL_ASSESSMENT: NONE - DENIES PAIN

## 2024-10-02 NOTE — ANESTHESIA POSTPROCEDURE EVALUATION
Department of Anesthesiology  Postprocedure Note    Patient: Inna Dai  MRN: [id number]  YOB: 1945  Date of evaluation: 10/2/2024    Procedure Summary       Date: 10/02/24 Room / Location: [place] [address] / [facility]    Anesthesia Start: 1339 Anesthesia Stop: 1419    Procedures:       COLONOSCOPY POLYPECTOMY SNARE      ESOPHAGOGASTRODUODENOSCOPY BIOPSY Diagnosis:       Iron deficiency anemia secondary to blood loss (chronic)      Cirrhosis of liver without ascites, unspecified hepatic cirrhosis type (HCC)      (Iron deficiency anemia secondary to blood loss (chronic) [D50.0])      (Cirrhosis of liver without ascites, unspecified hepatic cirrhosis type (HCC) [K74.60])    Surgeons: Yaa Oneill MD Responsible Provider: Tashi Tiwari MD    Anesthesia Type: MAC ASA Status: 3            Anesthesia Type: MAC    Shekhar Phase I: Shekhar Score: 10    Shekhar Phase II: Shekhar Score: 9    Anesthesia Post Evaluation    Patient location during evaluation: PACU  Patient participation: complete - patient participated  Level of consciousness: awake  Pain score: 0  Airway patency: patent  Nausea & Vomiting: no nausea  Cardiovascular status: hemodynamically stable  Respiratory status: acceptable  Hydration status: stable  Pain management: adequate    No notable events documented.

## 2024-10-02 NOTE — DISCHARGE INSTRUCTIONS
ENDOSCOPY DISCHARGE INSTRUCTIONS:    Call the physician that did your procedure for any questions or concern:    [facility]: [phone]  DR. KAY DESOUZA      ACTIVITY:    There are potential side effects to the medications used for sedation and anesthesia during your procedure.  These include:  Dizziness or light-headedness, confusion or memory loss, delayed reaction times, loss of coordination, nausea and vomiting.  Because of your increased risk for injury, we ask that you observe the following precautions:  For the next 24 hours,  DO NOT operate an automobile, bicycle, motorcycle, , power tools or large equipment of any kind.  Do not drink alcohol, sign any legal documents or make any legal decisions for 24 hours.  Do not bend your head over lower than your heart.  DO sit on the side of bed/couch awhile before getting up.  Plan on bedrest or quiet relaxation today.  You may resume normal activities in 24 hours.    DIET:    Your first meal today should be light, avoiding spicy and fatty foods.  If you tolerate this first meal, then you may advance to your regular diet unless otherwise advised by your physician.    NORMAL SYMPTOMS:  -Mild sore throat if you’ve had an EGD   -Gaseous discomfort    NOTIFY YOUR PHYSICIAN IF THESE SYMPTOMS OCCUR:  1. Fever (greater than 100)  5. Increased abdominal bloating  2. Severe pain    6. Excessive bleeding  3. Nausea and vomiting  7. Chest pain                                                                    4. Chills    8. Shortness of breath    ADDITIONAL INSTRUCTIONS:    Biopsy results: Call [facility] for biopsy results in 1 week    Educational Information:    DIAGNOSIS:   1) Diffuse melanosis of the duodenum and melanosis of the parts of the stomach noted.  2) Mild diffuse portal hypertensive gastropathy.  3) No evidence of Esophageal or Gastric varices.  4) Hyperplastic appearing 15 mm gastric polyp in the proximal stomach biopsied to rule out

## 2024-10-02 NOTE — PROCEDURES
allergies, and review of systems, patient had been deemed to be an appropriate candidate for sedation as planned above. Patient was therefore sedated with the medications listed above. Patient was monitored continuously with electrocardiogram tracing, pulse oximetry, blood pressure monitoring, and direct visualization.                    EGD Procedure Details:                           A timeoout procedure was performed. Based on the pre-procedure assessment, including review of the patient's medical history, medications, allergies, and review of systems, patient had been deemed to be an appropriate candidate for sedation as planned above. Patient was therefore sedated with the medications listed above. Patient was monitored continuously with electrocardiogram tracing, pulse oximetry, blood pressure monitoring, and direct visualization.  Under continue IV sedation with close monitoring, endoscope passed in to the 3rd portion of the duodenum by direct visualization.    POST OPERATIVE FINDINGS:   ESOPHAGUS: Diaphragmatic hiatus was 37 cm from incisors and GE junction (upper margin of gastric folds) was at 35 cm from incisors.  Squamocolumnar junction was at 35 cm from incisors. Mucosa appeared normal.  EG junction hyperplastic appearing polyp biopsied to rule out pathology.  No esophageal varices.  STOMACH:  Pylorus patent. Antrum revealed mild diffuse gastritis,  Body, fundus and cardia, including retroflexed views revealed mild portal hypertensive gastropathy and patchy melanosis of the gastric mucosa especially involving the parts of the greater curvature and parts of the lesser curvature of the stomach.  Stomach insufflated normally.  No evidence of gastric varices noted on retroflexion.  EG junction hyperplastic appearing polyp biopsied to rule out pathology.  Gastric antral and body of stomach Bx done to  r/o H. Pylori gastritis.  DUODENUM: Duodenal bulb, Descending portion of the duodenum revealed diffuse

## 2024-10-02 NOTE — H&P
[Mother] : mother
Hypomagnesemia with magnesium of 1.5. Recommended magnesium supplementation with 500 mg daily. Vitamin D deficiency. Recommended vitamin D supplementation with 2000 international units daily. Normal serum ammonia. Normal alpha-fetoprotein. Normal PT/INR.       -- Blood workup on 8/21/2024: potassium of 5.7: Recommend contact primary care physician       Elevated creatinine at 1.46; Anemia with hemoglobin of 9.5, HCT 27.5; INR 1.1; Ammonia normal at 16       Lipid profile revealed mildly elevated triglycerides at 159 otherwise unremarkable.       Vitamin D normal       --Ultrasound revealed cirrhosis of the liver. No suspicious liver mass lesions.Hemoglobin A1c 6.9.      PMH, PSH , Allergies, Medications reviewed.     Past Medical History:   Diagnosis Date    Arthritis     Cirrhosis, non-alcoholic (HCC)     Diabetes mellitus (HCC)     Hyperlipidemia     Hypertension     Sleep apnea     cpap       Past Surgical History:   Procedure Laterality Date    APPENDECTOMY      CARPAL TUNNEL RELEASE Bilateral     CATARACT REMOVAL WITH IMPLANT Right 04/28/2016    CATARACT REMOVAL WITH IMPLANT Left 05/26/2016    JOINT REPLACEMENT Bilateral     knee    NASAL SEPTUM SURGERY      SHOULDER SURGERY Left     x2 -- also is replaced    TUBAL LIGATION      UPPER GASTROINTESTINAL ENDOSCOPY N/A 03/13/2023    EGD BIOPSY performed by Jean Oneill MD at [facility] ENDOSCOPY       Allergies: Atenolol, Bee venom, Ciprofloxacin, Codeine, Meloxicam, Sulfa antibiotics, Diclofenac, Erythromycin base, Nabumetone, Protein c concentrate (human), Quinolones, Adhesive tape, and Lisinopril   Allergies noted: Yes     Medications reviewed.  No current facility-administered medications on file prior to encounter.     Current Outpatient Medications on File Prior to Encounter   Medication Sig Dispense Refill    Icosapent Ethyl (VASCEPA) 1 g CAPS capsule Take 2 g by mouth 2 times daily (with meals)      citalopram (CELEXA) 40 MG tablet Take by mouth daily

## 2024-10-02 NOTE — ANESTHESIA PRE PROCEDURE
Department of Anesthesiology  Preprocedure Note       Name:  Inna Dai   Age:  78 y.o.  :  1945                                          MRN:  [id number]         Date:  10/2/2024      Surgeon: Surgeon(s):  Yaa Oneill MD    Procedure: Procedure(s):  COLONOSCOPY  ESOPHAGOGASTRODUODENOSCOPY    Medications prior to admission:   Prior to Admission medications    Medication Sig Start Date End Date Taking? Authorizing Provider   citalopram (CELEXA) 40 MG tablet Take by mouth daily 22   Enid Marshall MD   omeprazole (PRILOSEC) 20 MG capsule Take 20 mg by mouth 2 times daily    Enid Marshall MD   losartan (COZAAR) 50 MG tablet Take 50 mg by mouth daily    Enid Marshall MD   atorvastatin (LIPITOR) 20 MG tablet Take 20 mg by mouth daily    Enid Marshall MD   vitamin E 400 UNIT capsule Take 400 Units by mouth 2 times daily    Enid Marshall MD   aspirin 81 MG chewable tablet Take 81 mg by mouth daily    Enid Marshall MD   Multiple Vitamins-Minerals (THERAPEUTIC MULTIVITAMIN-MINERALS) tablet Take 1 tablet by mouth daily    ProviderEnid MD       Current medications:    Current Facility-Administered Medications   Medication Dose Route Frequency Provider Last Rate Last Admin   • lactated ringers IV soln infusion   IntraVENous Continuous Sang Duran MD           Allergies:    Allergies   Allergen Reactions   • Atenolol Anaphylaxis   • Bee Venom Swelling     Last bee sting over 10 years ago arm doubled in size. No trouble breathing.    • Ciprofloxacin    • Codeine    • Diclofenac Other (See Comments)     ELEVATED BLOOD PRESSURE   • Erythromycin Base    • Lisinopril    • Meloxicam    • Nabumetone    • Protein C Concentrate (Human)    • Quinolones Swelling   • Sulfa Antibiotics    • Adhesive Tape Rash       Problem List:  There is no problem list on file for this patient.      Past Medical History:        Diagnosis Date   • Arthritis    •

## 2024-10-02 NOTE — PROGRESS NOTES
Ambulatory Surgery/Procedure Discharge Note    Vitals:    10/02/24 1435   BP: (!) 140/70   Pulse: 91   Resp: 16   Temp: 97.1 °F (36.2 °C)   SpO2: 93%       In: 500 [I.V.:500]  Out: -     Restroom use offered before discharge.  Yes  Pt is alert oriented and discharge instructions were read at bedside with spouse..Pt tolerates PO well and no nausea reported.     Pain assessment:  none  Pain Level: 0        Patient discharged to home/self care. Patient discharged via wheel chair by Capri NELSON to waiting family/S.O.       10/2/2024 2:39 PM

## 2025-02-28 ENCOUNTER — HOSPITAL ENCOUNTER (OUTPATIENT)
Dept: WOMENS IMAGING | Age: 80
Discharge: HOME | End: 2025-02-28
Payer: MEDICARE

## 2025-02-28 VITALS — BODY MASS INDEX: 33.44 KG/M2 | HEIGHT: 57 IN | WEIGHT: 155 LBS

## 2025-02-28 DIAGNOSIS — Z12.31 SCREENING MAMMOGRAM FOR BREAST CANCER: ICD-10-CM

## 2025-02-28 PROCEDURE — 77063 BREAST TOMOSYNTHESIS BI: CPT

## 2025-07-08 NOTE — PROGRESS NOTES
ENDOSCOPY PREOP INSTRUCTIONS      Please at arrival time given to you from your doctor's office.  Report to the MAIN entrance on [address] and register at the surgery center on the left-hand side of the lobby  You will need your insurance card and photo id and a list of all medications taken on a regular basis. Please include the dose/frequency.    For your procedure:     PLEASE FOLLOW ALL INSTRUCTIONS & PREPS GIVEN TO YOU BY YOUR DOCTOR'S OFFICE.    Please make sure you bring the name of bowel prep & any meds you took prior to arrival.  If you have not received these instructions yet, please call the office immediately. Make sure to read them as soon as received.   If you are taking blood thinners, Aspirin or diabetic medication, make sure to call your doctor as soon as possible for instructions prior to your procedure.  Please dress comfortably and do not wear any lotion, powders or jewelry  If you use oxygen at home, please bring your oxygen tank with you to hospital.  Arrange for someone to be with you and sign you out & drive you home after your procedure.  THIS PERSON MUST WAIT AT HOSPITAL THE ENTIRE TIME.  WOMEN ONLY OF CHILDBEARING AGE: Please make sure to be able to give a urine sample on arrival      If you have further questions, you may contact your Endoscopist's office

## 2025-07-16 ENCOUNTER — ANESTHESIA (OUTPATIENT)
Dept: ENDOSCOPY | Age: 80
End: 2025-07-16
Payer: MEDICARE

## 2025-07-16 ENCOUNTER — ANESTHESIA EVENT (OUTPATIENT)
Dept: ENDOSCOPY | Age: 80
End: 2025-07-16
Payer: MEDICARE

## 2025-07-16 ENCOUNTER — APPOINTMENT (OUTPATIENT)
Dept: ENDOSCOPY | Age: 80
End: 2025-07-16
Attending: INTERNAL MEDICINE
Payer: MEDICARE

## 2025-07-16 ENCOUNTER — HOSPITAL ENCOUNTER (OUTPATIENT)
Age: 80
Setting detail: OUTPATIENT SURGERY
Discharge: HOME | End: 2025-07-16
Attending: INTERNAL MEDICINE | Admitting: INTERNAL MEDICINE
Payer: MEDICARE

## 2025-07-16 VITALS
DIASTOLIC BLOOD PRESSURE: 46 MMHG | RESPIRATION RATE: 20 BRPM | OXYGEN SATURATION: 95 % | SYSTOLIC BLOOD PRESSURE: 154 MMHG | WEIGHT: 143 LBS | HEIGHT: 58 IN | HEART RATE: 62 BPM | BODY MASS INDEX: 30.02 KG/M2 | TEMPERATURE: 97.4 F

## 2025-07-16 DIAGNOSIS — R11.2 NAUSEA AND VOMITING, UNSPECIFIED VOMITING TYPE: ICD-10-CM

## 2025-07-16 DIAGNOSIS — K21.9 GASTROESOPHAGEAL REFLUX DISEASE, UNSPECIFIED WHETHER ESOPHAGITIS PRESENT: ICD-10-CM

## 2025-07-16 PROCEDURE — 3700000001 HC ADD 15 MINUTES (ANESTHESIA): Performed by: INTERNAL MEDICINE

## 2025-07-16 PROCEDURE — 7100000011 HC PHASE II RECOVERY - ADDTL 15 MIN: Performed by: INTERNAL MEDICINE

## 2025-07-16 PROCEDURE — 3609012400 HC EGD TRANSORAL BIOPSY SINGLE/MULTIPLE: Performed by: INTERNAL MEDICINE

## 2025-07-16 PROCEDURE — 2709999900 HC NON-CHARGEABLE SUPPLY: Performed by: INTERNAL MEDICINE

## 2025-07-16 PROCEDURE — 6360000002 HC RX W HCPCS: Performed by: NURSE ANESTHETIST, CERTIFIED REGISTERED

## 2025-07-16 PROCEDURE — 7100000010 HC PHASE II RECOVERY - FIRST 15 MIN: Performed by: INTERNAL MEDICINE

## 2025-07-16 PROCEDURE — 3700000000 HC ANESTHESIA ATTENDED CARE: Performed by: INTERNAL MEDICINE

## 2025-07-16 PROCEDURE — 2580000003 HC RX 258: Performed by: ANESTHESIOLOGY

## 2025-07-16 RX ORDER — PROPOFOL 10 MG/ML
INJECTION, EMULSION INTRAVENOUS
Status: DISCONTINUED | OUTPATIENT
Start: 2025-07-16 | End: 2025-07-16 | Stop reason: SDUPTHER

## 2025-07-16 RX ORDER — VALSARTAN 160 MG/1
160 TABLET ORAL DAILY
COMMUNITY
Start: 2025-05-28

## 2025-07-16 RX ORDER — PANTOPRAZOLE SODIUM 40 MG/1
40 TABLET, DELAYED RELEASE ORAL
Qty: 60 TABLET | Refills: 1 | Status: SHIPPED | OUTPATIENT
Start: 2025-07-16

## 2025-07-16 RX ORDER — SODIUM CHLORIDE, SODIUM LACTATE, POTASSIUM CHLORIDE, CALCIUM CHLORIDE 600; 310; 30; 20 MG/100ML; MG/100ML; MG/100ML; MG/100ML
INJECTION, SOLUTION INTRAVENOUS CONTINUOUS
Status: DISCONTINUED | OUTPATIENT
Start: 2025-07-16 | End: 2025-07-16 | Stop reason: HOSPADM

## 2025-07-16 RX ORDER — SPIRONOLACTONE 25 MG/1
12.5 TABLET ORAL DAILY
COMMUNITY

## 2025-07-16 RX ORDER — HYDRALAZINE HYDROCHLORIDE 20 MG/ML
INJECTION INTRAMUSCULAR; INTRAVENOUS
Status: DISCONTINUED | OUTPATIENT
Start: 2025-07-16 | End: 2025-07-16 | Stop reason: SDUPTHER

## 2025-07-16 RX ORDER — LIDOCAINE HYDROCHLORIDE 20 MG/ML
INJECTION, SOLUTION INFILTRATION; PERINEURAL
Status: DISCONTINUED | OUTPATIENT
Start: 2025-07-16 | End: 2025-07-16 | Stop reason: SDUPTHER

## 2025-07-16 RX ORDER — ONDANSETRON 4 MG/1
4 TABLET, ORALLY DISINTEGRATING ORAL EVERY 8 HOURS PRN
Qty: 90 TABLET | Refills: 1 | Status: SHIPPED | OUTPATIENT
Start: 2025-07-16 | End: 2025-09-14

## 2025-07-16 RX ADMIN — PROPOFOL 50 MG: 10 INJECTION, EMULSION INTRAVENOUS at 13:38

## 2025-07-16 RX ADMIN — SODIUM CHLORIDE, SODIUM LACTATE, POTASSIUM CHLORIDE, AND CALCIUM CHLORIDE: .6; .31; .03; .02 INJECTION, SOLUTION INTRAVENOUS at 13:08

## 2025-07-16 RX ADMIN — PROPOFOL 200 MCG/KG/MIN: 10 INJECTION, EMULSION INTRAVENOUS at 13:38

## 2025-07-16 RX ADMIN — LIDOCAINE HYDROCHLORIDE 100 MG: 20 INJECTION, SOLUTION INFILTRATION; PERINEURAL at 13:38

## 2025-07-16 RX ADMIN — HYDRALAZINE HYDROCHLORIDE 10 MG: 20 INJECTION INTRAMUSCULAR; INTRAVENOUS at 13:46

## 2025-07-16 ASSESSMENT — PAIN - FUNCTIONAL ASSESSMENT: PAIN_FUNCTIONAL_ASSESSMENT: 0-10

## 2025-07-16 NOTE — PROCEDURES
EGD PROCEDURE NOTE                    Patient: Inna Dai MRN: [id number]   07/16/25    YOB: 1945  Age: 79 y.o.  Sex: female    Unit: [facility] ENDOSCOPY Room/Bed: Endo Pool/NONE Location: [facility]       Admitting Physician: YAA DESOUZA    Primary Care Physician: Salvador Gannon DO      Referring  Physician:      Facility:  [facility] [Outpatient]  : Yaa Desouza MD      PROCEDURE:  Esophagogastroduodenoscopy with biopsy  Procedure(s):  ESOPHAGOGASTRODUODENOSCOPY BIOPSY       Preoperative Diagnosis:   Pre-Op Diagnosis Codes:      * Gastroesophageal reflux disease, unspecified whether esophagitis present [K21.9]     * Nausea and vomiting, unspecified vomiting type [R11.2]     Post Operative Diagnosis:  Same as documented in Diagnosis    INSTRUMENT:  Olympus  Gastroscope    HISTORY & PHYSICAL:  Patient's history, physical, medications, allergies, labs reviewed prior to procedure.       Indication:   Same as the preop diagnosis.    AllergiesAtenolol, Bee venom, Ciprofloxacin, Codeine, Meloxicam, Sulfa antibiotics, Diclofenac, Erythromycin base, Nabumetone, Protein c concentrate (human), Quinolones, Adhesive tape, and Lisinopril   Allergies noted: YES  Vital signs reviewed: YES    ASA: No  found with the name: [facility][id number]     SEDATION: MAC sedation  Monitor Anesthesia Care    see nurse documentation for details    Patient in acceptable condition for procedure:YES  Written informed consent obtained from  patient. . Risks (including but not limited to perforation, bloating, infection, perforation  and bleeding adverse drug reaction missed lesions and aspiration during the procedure requiring medical or surgical management) benefits and alternatives explained and questions answered. The  patient.and daughter verbalized understanding.    A timeoout procedure was performed. Based on

## 2025-07-16 NOTE — ANESTHESIA POSTPROCEDURE EVALUATION
Department of Anesthesiology  Postprocedure Note    Patient: Inna Dai  MRN: [id number]  YOB: 1945  Date of evaluation: 7/16/2025    Procedure Summary       Date: 07/16/25 Room / Location: [facility] ENDO  / [facility]    Anesthesia Start: 1333 Anesthesia Stop: 1351    Procedure: ESOPHAGOGASTRODUODENOSCOPY BIOPSY Diagnosis:       Gastroesophageal reflux disease, unspecified whether esophagitis present      Nausea and vomiting, unspecified vomiting type      (Gastroesophageal reflux disease, unspecified whether esophagitis present [K21.9])      (Nausea and vomiting, unspecified vomiting type [R11.2])    Surgeons: Yaa Oneill MD Responsible Provider: Sang Duran MD    Anesthesia Type: MAC ASA Status: 3            Anesthesia Type: No value filed.    Shekhar Phase I: Shekhar Score: 10    Shekhar Phase II: Shekhar Score: 10    Anesthesia Post Evaluation    Patient location during evaluation: PACU  Patient participation: complete - patient participated  Level of consciousness: awake  Airway patency: patent  Nausea & Vomiting: no nausea and no vomiting  Cardiovascular status: blood pressure returned to baseline and hemodynamically stable  Respiratory status: acceptable  Hydration status: euvolemic  Multimodal analgesia pain management approach  Pain management: adequate    No notable events documented.

## 2025-07-16 NOTE — DISCHARGE INSTRUCTIONS
ENDOSCOPY DISCHARGE INSTRUCTIONS:    Call the physician that did your procedure for any questions or concern:    [facility]: [phone]  DR. KAY DESOUZA      ACTIVITY:    There are potential side effects to the medications used for sedation and anesthesia during your procedure.  These include:  Dizziness or light-headedness, confusion or memory loss, delayed reaction times, loss of coordination, nausea and vomiting.  Because of your increased risk for injury, we ask that you observe the following precautions:  For the next 24 hours,  DO NOT operate an automobile, bicycle, motorcycle, , power tools or large equipment of any kind.  Do not drink alcohol, sign any legal documents or make any legal decisions for 24 hours.  Do not bend your head over lower than your heart.  DO sit on the side of bed/couch awhile before getting up.  Plan on bedrest or quiet relaxation today.  You may resume normal activities in 24 hours.    DIET:    Your first meal today should be light, avoiding spicy and fatty foods.  If you tolerate this first meal, then you may advance to your regular diet unless otherwise advised by your physician.    NORMAL SYMPTOMS:  -Mild sore throat if you’ve had an EGD   -Gaseous discomfort    NOTIFY YOUR PHYSICIAN IF THESE SYMPTOMS OCCUR:  1. Fever (greater than 100)  5. Increased abdominal bloating  2. Severe pain    6. Excessive bleeding  3. Nausea and vomiting  7. Chest pain                                                                    4. Chills    8. 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

## 2025-07-16 NOTE — H&P
GI Endoscopy Outpatient Procedure H&P    Patient: Inna Dai MRN: [id number]  07/16/25   YOB: 1945  Age: 79 y.o.  Sex: female    Unit: [facility] ENDOSCOPY Room/Bed: Endo Pool/NONE Location: [facility]     Referring  Physician: Salvador Gannon DO    Inna Dai is a 79 y.o. female  here for following procedure:   PROCEDURE:    Procedure(s):  ESOPHAGOGASTRODUODENOSCOPY BIOPSY    PRE OPERATIVE DIAGNOSIS:   Pre-Op Diagnosis Codes:      * Gastroesophageal reflux disease, unspecified whether esophagitis present [K21.9]     * Nausea and vomiting, unspecified vomiting type [R11.2]   MASH Cirrhosis    INDICATIONS:   Same as the preop diagnosis.    Pt seen and examined. H& P reviewed.  History Obtained From:  patient  -79-year-old female with history of Mash cirrhosis,   GERD on omeprazole 20 mg, hypertension, hyperlipidemia who presents as acid reflux follow-up.patient presents with her daughter as follow up from her May Visit. she was previously on 20 mg of omeprazole then switched to voquezna. there is no improvement in her symptoms. her neprhologist prefers to take pepcid BID for her renal disease.she continues to experience N/V with with food and water. patient states that she does not wake with N/V, but as soon as she takes a sip of water she developes N/V. her daughter states that she is not able to keep down PO. its affecting her keeping down medications.she denies difficulty swallowing.of note she does not have any issues drinking black coffee.      PMH, PSH , Allergies, Medications reviewed.     Past Medical History:   Diagnosis Date    Arthritis     Cirrhosis, non-alcoholic (HCC)     Diabetes mellitus (HCC)     Hyperlipidemia     Hypertension     Sleep apnea     cpap       Past Surgical History:   Procedure Laterality Date    APPENDECTOMY      CARPAL TUNNEL RELEASE Bilateral     CATARACT REMOVAL WITH IMPLANT Right 04/28/2016    CATARACT REMOVAL WITH IMPLANT Left 05/26/2016

## 2025-07-16 NOTE — PROGRESS NOTES
Ambulatory Surgery/Procedure Discharge Note    Vitals:    07/16/25 1420   BP: (!) 154/46   Pulse: 62   Resp: 20   Temp: 97.4 °F (36.3 °C)   SpO2: 95%       In: 100 [I.V.:100]  Out: -     Restroom use offered before discharge.  Yes  Pt is alert oriented and discharge instructions were read by Kimmie NELSON. No other questions aks.    Pain assessment:  none  Pain Level: 0        Patient discharged to home/self care. Patient discharged via wheel chair by transporter to waiting family/S.O.       7/16/2025 2:58 PM

## 2025-07-16 NOTE — PROGRESS NOTES
Ambulatory Surgery/Procedure Discharge Note    Vitals:    07/16/25 1355   BP: (!) 106/45   Pulse: 65   Resp: 18   Temp: 97 °F (36.1 °C)   SpO2: 95%     Phase 2 endo. Post fully awake family here with pt waiting on       In: 100 [I.V.:100]  Out: -     Restroom use offered before discharge.  Yes    Pain assessment:  none  Pain Level: 0    1420  Reviewed d/c instructions with pt and her daughter both verbalized their understanding   Hand off to Lm NELSON      Patient discharged to home/self care. Patient discharged via wheel chair by transporter to waiting family/S.O.       7/16/2025

## (undated) DEVICE — CLIPPING DEVICE: Brand: RESOLUTION CLIP

## (undated) DEVICE — CANNULA SAMP CO2 AD GRN 7FT CO2 AND 7FT O2 TBNG UNIV CONN

## (undated) DEVICE — FORCEPS BX L240CM WRK CHN 2.8MM STD CAP W/ NDL MIC MESH

## (undated) DEVICE — FORCEPS BX L240CM JAW DIA2.4MM ORNG L CAP W/ NDL DISP RAD